# Patient Record
Sex: FEMALE | Race: WHITE | NOT HISPANIC OR LATINO | Employment: FULL TIME | URBAN - METROPOLITAN AREA
[De-identification: names, ages, dates, MRNs, and addresses within clinical notes are randomized per-mention and may not be internally consistent; named-entity substitution may affect disease eponyms.]

---

## 2017-03-07 ENCOUNTER — PATIENT MESSAGE (OUTPATIENT)
Dept: INTERNAL MEDICINE | Facility: CLINIC | Age: 27
End: 2017-03-07

## 2017-03-08 ENCOUNTER — TELEPHONE (OUTPATIENT)
Dept: INTERNAL MEDICINE | Facility: CLINIC | Age: 27
End: 2017-03-08

## 2017-03-08 DIAGNOSIS — R00.2 PALPITATIONS: Primary | ICD-10-CM

## 2017-03-09 ENCOUNTER — TELEPHONE (OUTPATIENT)
Dept: INTERNAL MEDICINE | Facility: CLINIC | Age: 27
End: 2017-03-09

## 2017-03-09 NOTE — TELEPHONE ENCOUNTER
Pt states she will call back at a later date. Unable to schedule at this time. Pt has no further questions or concerns at this time.

## 2017-03-09 NOTE — TELEPHONE ENCOUNTER
----- Message from Ciara Valencia sent at 3/9/2017 10:25 AM CST -----  Contact: pt   X_  1st Request  _  2nd Request  _  3rd Request    Who:PARKER LEON [[id number]]    Why: Patient is returning a call     What Number to Call Back: [phone]    When to Expect a call back: (Before the end of the day)   -- if call after 3:00 call back will be tomorrow.  3:50 PM  Patient was scheduled for EKG per Dr. Ponce but would like to call us later to schedule a follow up visit with Dr. Ponce.

## 2017-03-10 ENCOUNTER — HOSPITAL ENCOUNTER (OUTPATIENT)
Dept: CARDIOLOGY | Facility: OTHER | Age: 27
Discharge: HOME | End: 2017-03-10
Attending: FAMILY MEDICINE
Payer: COMMERCIAL

## 2017-03-10 DIAGNOSIS — R00.2 PALPITATIONS: ICD-10-CM

## 2017-03-10 PROCEDURE — 93010 ELECTROCARDIOGRAM REPORT: CPT | Mod: ,,, | Performed by: INTERNAL MEDICINE

## 2017-03-10 PROCEDURE — 93005 ELECTROCARDIOGRAM TRACING: CPT

## 2017-03-15 ENCOUNTER — PATIENT MESSAGE (OUTPATIENT)
Dept: INTERNAL MEDICINE | Facility: CLINIC | Age: 27
End: 2017-03-15

## 2017-03-16 ENCOUNTER — OFFICE VISIT (OUTPATIENT)
Dept: INTERNAL MEDICINE | Facility: CLINIC | Age: 27
End: 2017-03-16
Attending: FAMILY MEDICINE
Payer: COMMERCIAL

## 2017-03-16 ENCOUNTER — LAB VISIT (OUTPATIENT)
Dept: LAB | Facility: OTHER | Age: 27
End: 2017-03-16
Attending: FAMILY MEDICINE
Payer: COMMERCIAL

## 2017-03-16 VITALS
WEIGHT: 135.56 LBS | HEIGHT: 66 IN | DIASTOLIC BLOOD PRESSURE: 52 MMHG | OXYGEN SATURATION: 99 % | HEART RATE: 54 BPM | SYSTOLIC BLOOD PRESSURE: 90 MMHG | BODY MASS INDEX: 21.79 KG/M2

## 2017-03-16 DIAGNOSIS — R00.2 HEART PALPITATIONS: Primary | ICD-10-CM

## 2017-03-16 DIAGNOSIS — R31.9 HEMATURIA: ICD-10-CM

## 2017-03-16 DIAGNOSIS — R00.2 HEART PALPITATIONS: ICD-10-CM

## 2017-03-16 LAB
BASOPHILS # BLD AUTO: 0.05 K/UL
BASOPHILS NFR BLD: 0.8 %
DIFFERENTIAL METHOD: NORMAL
EOSINOPHIL # BLD AUTO: 0.1 K/UL
EOSINOPHIL NFR BLD: 1.7 %
ERYTHROCYTE [DISTWIDTH] IN BLOOD BY AUTOMATED COUNT: 12.4 %
FERRITIN SERPL-MCNC: 28 NG/ML
HCT VFR BLD AUTO: 39.6 %
HGB BLD-MCNC: 12.9 G/DL
IRON SERPL-MCNC: 110 UG/DL
LYMPHOCYTES # BLD AUTO: 1.7 K/UL
LYMPHOCYTES NFR BLD: 28.5 %
MCH RBC QN AUTO: 30.4 PG
MCHC RBC AUTO-ENTMCNC: 32.6 %
MCV RBC AUTO: 93 FL
MONOCYTES # BLD AUTO: 0.4 K/UL
MONOCYTES NFR BLD: 6.4 %
NEUTROPHILS # BLD AUTO: 3.7 K/UL
NEUTROPHILS NFR BLD: 62.6 %
PLATELET # BLD AUTO: 203 K/UL
PMV BLD AUTO: 11.3 FL
RBC # BLD AUTO: 4.24 M/UL
SATURATED IRON: 28 %
T4 FREE SERPL-MCNC: 0.87 NG/DL
TOTAL IRON BINDING CAPACITY: 389 UG/DL
TRANSFERRIN SERPL-MCNC: 263 MG/DL
TSH SERPL DL<=0.005 MIU/L-ACNC: 0.73 UIU/ML
WBC # BLD AUTO: 5.94 K/UL

## 2017-03-16 PROCEDURE — 82728 ASSAY OF FERRITIN: CPT

## 2017-03-16 PROCEDURE — 85025 COMPLETE CBC W/AUTO DIFF WBC: CPT

## 2017-03-16 PROCEDURE — 99214 OFFICE O/P EST MOD 30 MIN: CPT | Mod: S$GLB,,, | Performed by: FAMILY MEDICINE

## 2017-03-16 PROCEDURE — 99999 PR PBB SHADOW E&M-EST. PATIENT-LVL III: CPT | Mod: PBBFAC,,, | Performed by: FAMILY MEDICINE

## 2017-03-16 PROCEDURE — 36415 COLL VENOUS BLD VENIPUNCTURE: CPT

## 2017-03-16 PROCEDURE — 84439 ASSAY OF FREE THYROXINE: CPT

## 2017-03-16 PROCEDURE — 83540 ASSAY OF IRON: CPT

## 2017-03-16 PROCEDURE — 84443 ASSAY THYROID STIM HORMONE: CPT

## 2017-03-16 NOTE — PROGRESS NOTES
"Subjective:      Patient ID: Chela Lopes is a 26 y.o. female.    Chief Complaint: Palpitations (f/u)    HPI Comments: She has noticed palpitations worsening in the last one two months. She has decreased caffeine intake to one cappucino 30-40mg caffeine daily with no noticeable differences. It can happen anytime of day. It lasts for 5 minutes. She will trial vasalva maneuver with no improvement. She denies dizziness, headaches, sob or diaphoresis with it. It does occur everyday. No changes in menstrual cycle. No blood in stool or black tarry stools. She reports stress level controlled no increased anxiety. She drinks at least 60 fl oz water daily.     Palpitations        Review of Systems   Constitutional: Negative.    HENT: Negative.    Eyes: Negative.    Respiratory: Negative.    Cardiovascular: Positive for palpitations.   Gastrointestinal: Negative.    Genitourinary: Negative.      I personally reviewed Past Medical History, Past Surgical history,  Past Social History and Family History    Objective:   BP (!) 90/52  Pulse (!) 54  Ht 5' 6" (1.676 m)  Wt 61.5 kg (135 lb 9.3 oz)  SpO2 99%  BMI 21.88 kg/m2    Physical Exam   Constitutional: She is oriented to person, place, and time. She appears well-developed and well-nourished. No distress.   HENT:   Head: Normocephalic and atraumatic.   Right Ear: External ear normal.   Left Ear: External ear normal.   Mouth/Throat: Oropharynx is clear and moist.   Eyes: Conjunctivae and EOM are normal. Pupils are equal, round, and reactive to light. Right eye exhibits no discharge. Left eye exhibits no discharge. No scleral icterus.   Neck: Normal range of motion. Neck supple.   Cardiovascular: Normal rate, regular rhythm, normal heart sounds and intact distal pulses.  Exam reveals no gallop.    No murmur heard.  Pulmonary/Chest: Effort normal and breath sounds normal. No respiratory distress. She has no wheezes. She has no rales. She exhibits no tenderness. "   Abdominal: Soft. Bowel sounds are normal. She exhibits no distension and no mass. There is no tenderness. There is no rebound and no guarding.   Musculoskeletal: Normal range of motion.   Neurological: She is alert and oriented to person, place, and time.   Skin: Skin is warm and dry.   Psychiatric: She has a normal mood and affect. Her behavior is normal. Judgment and thought content normal.   Vitals reviewed.      Chela was seen today for palpitations.    Diagnoses and all orders for this visit:    Heart palpitations  -will check labs and check holter monitor, she will call with any worsening   -     CBC auto differential; Future  -     Iron and TIBC; Future  -     Ferritin; Future  -     TSH; Future  -     T4, free; Future  -     Holter monitor - 24 hour; Future  -     Urinalysis  -     Urine culture    Hematuria  -     Urinalysis  -     Urine culture

## 2017-03-16 NOTE — MR AVS SNAPSHOT
"    [facility]  [address]  [place] [address]  Phone: [phone]  Fax: [phone]                  Chela Hendricksontanya   3/16/2017 3:00 PM   Office Visit    Description:  Female : 1990   Provider:  Sharmila Ponce MD   Department:  [facility]           Reason for Visit     Palpitations           Diagnoses this Visit        Comments    Heart palpitations    -  Primary     Hematuria                To Do List           Future Appointments        Provider Department Dept Phone    3/16/2017 4:00 PM LAB, SAME DAY [facility] [phone]    3/20/2017 8:40 AM HOLTER, EKG [facility] - Arrhythmia [phone]      Goals (5 Years of Data)     None      [facility]     [facility] -  Assistance  Registered nurses in the [facility] provide clinical advisement, health education, appointment booking, and other advisory services.  Call for this free service at [phone].             Medications           Message regarding Medications     Verify the changes and/or additions to your medication regime listed below are the same as discussed with your clinician today.  If any of these changes or additions are incorrect, please notify your healthcare provider.             Verify that the below list of medications is an accurate representation of the medications you are currently taking.  If none reported, the list may be blank. If incorrect, please contact your healthcare provider. Carry this list with you in case of emergency.           Current Medications     ketoconazole (NIZORAL) 2 % shampoo Apply topically twice a week.    levonorgestrel-ethinyl estradiol (AVIANE,ALESSE,LESSINA) 0.1-20 mg-mcg per tablet Take 1 tablet by mouth once daily.           Clinical Reference Information           Your Vitals Were     BP Pulse Height Weight SpO2 BMI    90/52 54 5' 6" (1.676 m) 61.5 kg (135 lb 9.3 oz) 99% " 21.88 kg/m2      Blood Pressure          Most Recent Value    BP  (!)  90/52      Allergies as of 3/16/2017     No Known Allergies      Immunizations Administered on Date of Encounter - 3/16/2017     None      Orders Placed During Today's Visit      Normal Orders This Visit    Urinalysis     Urine culture     Future Labs/Procedures Expected by Expires    CBC auto differential  3/16/2017 3/16/2018    Ferritin  3/16/2017 3/16/2018    Iron and TIBC  3/16/2017 3/16/2018    T4, free  3/16/2017 5/15/2018    TSH  3/16/2017 3/16/2018    Holter monitor - 24 hour  As directed 3/16/2018      Language Assistance Services     ATTENTION: Language assistance services are available, free of charge. Please call [phone].      ATENCIÓN: Si trish chavez, tiene a strickland disposición servicios gratuitos de asistencia lingüística. Llame al [phone].     CHÚ Ý: N?u b?n nói Ti?ng Vi?t, có các d?ch v? h? tr? ngôn ng? mi?n phí dành cho b?n. G?i s? [phone].         [facility] complies with applicable Federal civil rights laws and does not discriminate on the basis of race, color, national origin, age, disability, or sex.